# Patient Record
Sex: FEMALE | Race: OTHER
[De-identification: names, ages, dates, MRNs, and addresses within clinical notes are randomized per-mention and may not be internally consistent; named-entity substitution may affect disease eponyms.]

---

## 2019-07-19 ENCOUNTER — HOSPITAL ENCOUNTER (EMERGENCY)
Dept: HOSPITAL 72 - EMR | Age: 54
Discharge: HOME | End: 2019-07-19
Payer: MEDICAID

## 2019-07-19 VITALS — SYSTOLIC BLOOD PRESSURE: 156 MMHG | DIASTOLIC BLOOD PRESSURE: 70 MMHG

## 2019-07-19 VITALS — SYSTOLIC BLOOD PRESSURE: 158 MMHG | DIASTOLIC BLOOD PRESSURE: 79 MMHG

## 2019-07-19 VITALS — WEIGHT: 172 LBS | HEIGHT: 64 IN | BODY MASS INDEX: 29.37 KG/M2

## 2019-07-19 DIAGNOSIS — I10: ICD-10-CM

## 2019-07-19 DIAGNOSIS — M79.10: Primary | ICD-10-CM

## 2019-07-19 PROCEDURE — 99283 EMERGENCY DEPT VISIT LOW MDM: CPT

## 2019-07-19 PROCEDURE — 71045 X-RAY EXAM CHEST 1 VIEW: CPT

## 2019-07-19 NOTE — EMERGENCY ROOM REPORT
History of Present Illness


General


Chief Complaint:  Pain


Source:  Patient





Present Illness


HPI


Patient presents with complaints of a burning sensation to the left upper 

pectoralis region reports that symptoms ongoing for the past 3 to 4 days


Denies any headache denies any chest pain or shortness of breath denies any 

other breast pain


Denies any arm weakness or heaviness discomfort is fairly well localized


To the upper outer pectoralis region





Denies any other fall or trauma





After further questioning patient now reports that she has had a mammogram 10 

days ago and pending results patient also reports getting an immunization 

injection left deltoid region 2 days ago and having some discomfort soon after 

that


Allergies:  


Coded Allergies:  


     No Known Allergies (Unverified , 7/19/19)





Patient History


Past Medical History:  see triage record


Pertinent Family History:  none


Pregnant Now:  No


Reviewed Nursing Documentation:  PMH: Agreed; PSxH: Agreed





Nursing Documentation-PMH


Past Medical History:  No History, Except For


Hx Hypertension:  Yes





Review of Systems


All Other Systems:  negative except mentioned in HPI





Physical Exam





Vital Signs








  Date Time  Temp Pulse Resp B/P (MAP) Pulse Ox O2 Delivery O2 Flow Rate FiO2


 


7/19/19 17:59 98.1 96 20 165/81 (109) 96 Room Air  








Sp02 EP Interpretation:  reviewed, normal


General Appearance:  well appearing, no apparent distress


Head:  normocephalic, atraumatic


Eyes:  bilateral eye PERRL, bilateral eye EOMI


ENT:  hearing grossly normal, normal pharynx, TMs + canals normal, uvula midline


Neck:  full range of motion, supple, no meningismus, no bony tend


Respiratory:  lungs clear, normal breath sounds, no rhonchi, no respiratory 

distress, no retraction, no accessory muscle use


Cardiovascular #1:  normal peripheral pulses, regular rate, rhythm, no edema, 

no gallop, no JVD, no murmur


Gastrointestinal:  normal bowel sounds, non tender, soft, no mass, no 

organomegaly, non-distended, no guarding, no hernia, no pulsatile mass, no 

rebound


Genitourinary:  no CVA tenderness


Musculoskeletal:  normal inspection


Neurologic:  oriented x3, responsive, CNs III-XII nml as tested, motor strength/

tone normal, sensory intact


Psychiatric:  mood/affect normal


Skin:  other - No obvious palpable lymph nodes, no obvious rash, patient has 

fairly well specific pinpoint discomfort with palpating the upper lateral outer 

aspect of the pectoralis muscle


Lymphatic:  normal inspection, no adenopathy





Medical Decision Making


Diagnostic Impression:  


 Primary Impression:  


 Myalgia


 Additional Impression:  


 vaccine injection


ER Course


Initially after initial discussion with the patient we discussed possible 

mammogram and other imaging for further evaluation


At that point the patient's daughter reports that she just had a mammogram


And was pending follow-up of the results





Also reports having a recent vaccination in the deltoid area on the left upper 

arm


And soon after that having this discomfort to the upper outer chest area





No obvious dermatomal lesions are seen





I feel patient would benefit best from following up on the mammogram studies 

that were already performed


Chest x-ray was obtained for further evaluation which was negative patient is 

stable for close outpatient follow-up


Chest X-Ray Diagnostic Results


Chest X-Ray Diagnostic Results :  


   Chest X-Ray Ordered:  Yes


   # of Views/Limited/Complete:  1 View


   Indication:  Chest Pain


   EP Interpretation:  Yes


   Interpretation:  no consolidation, no effusion, no pneumothorax


   Impression:  No acute disease


   Electronically Signed by:  Beba Membreno DO





Last Vital Signs








  Date Time  Temp Pulse Resp B/P (MAP) Pulse Ox O2 Delivery O2 Flow Rate FiO2


 


7/19/19 17:59 98.1 96 20 165/81 (109) 96 Room Air  








Status:  improved


Disposition:  HOME, SELF-CARE


Condition:  Improved


Scripts


Ibuprofen* (MOTRIN*) 600 Mg Tablet


600 MG ORAL Q8H PRN for For Pain, #20 TAB 0 Refills


   Prov: Beab Membreno DO         7/19/19





Additional Instructions:  


Patient is provided with the discharge instructions notified to follow up with 

primary doctor in the next 2-3 days otherwise return to the er with any 

worsening symptoms.


Please note that this report is being documented using DRAGON technology.  This 

can lead to erroneous entry secondary to incorrect interpretation by the 

dictating instrument.











Beba Membreno DO Jul 19, 2019 18:28

## 2019-07-19 NOTE — NUR
ED Nurse Note:



PT WALKED IN DUE TO BURNING SENSATION ON HER LEFT UNDERARM THAT RADIATES TO HER 
LEFT BREAST X 5 DAYS. NO BREAST DISCHARGE OR DIMPLING. PT ALREADY HAD A 
MAMMOGRAM AND IS WAITING FOR RESULTS. AAO X4, AMBULATORY. DAUGHTER AT THE BED 
SIDE.

## 2019-07-19 NOTE — NUR
ER DISCHARGE NOTE:



Patient is cleared to be discharged per ERMD, pt is aox4, on room air, with 
stable vital signs. pt was given dc and prescription instructions, pt was able 
to verbalize understanding, pt id band removed without complications. pt is 
able to ambulate with steady gait. pt took all belongings and left with her 
daughter.

## 2019-07-20 NOTE — DIAGNOSTIC IMAGING REPORT
Indication: Chest pain

 

Comparison:  None

 

A single view chest radiograph was obtained.

 

Findings:

 

No definite infiltrate or pulmonary vascular congestion identified.  The heart is

enlarged. The bones are unremarkable.

 

Impression:

 

No acute disease